# Patient Record
Sex: FEMALE | Race: WHITE | NOT HISPANIC OR LATINO | URBAN - METROPOLITAN AREA
[De-identification: names, ages, dates, MRNs, and addresses within clinical notes are randomized per-mention and may not be internally consistent; named-entity substitution may affect disease eponyms.]

---

## 2018-09-07 ENCOUNTER — NEW PATIENT (OUTPATIENT)
Dept: URBAN - METROPOLITAN AREA CLINIC 80 | Facility: CLINIC | Age: 63
End: 2018-09-07
Payer: MEDICAID

## 2018-09-07 DIAGNOSIS — H25.811 COMBINED FORMS OF AGE-RELATED CATARACT, RIGHT EYE: Primary | ICD-10-CM

## 2018-09-07 DIAGNOSIS — H52.223 REGULAR ASTIGMATISM, BILATERAL: ICD-10-CM

## 2018-09-07 PROCEDURE — 92004 COMPRE OPH EXAM NEW PT 1/>: CPT | Performed by: OPHTHALMOLOGY

## 2018-09-07 ASSESSMENT — INTRAOCULAR PRESSURE
OS: 21
OD: 19

## 2018-09-07 ASSESSMENT — KERATOMETRY
OD: 46.40
OS: 46.14

## 2018-09-07 ASSESSMENT — VISUAL ACUITY
OD: 20/30
OS: 20/40

## 2018-10-05 ENCOUNTER — Encounter (OUTPATIENT)
Dept: URBAN - METROPOLITAN AREA CLINIC 80 | Facility: CLINIC | Age: 63
End: 2018-10-05
Payer: COMMERCIAL

## 2018-10-05 DIAGNOSIS — H25.812 COMBINED FORMS OF AGE-RELATED CATARACT, LEFT EYE: ICD-10-CM

## 2018-10-05 PROCEDURE — 99213 OFFICE O/P EST LOW 20 MIN: CPT | Performed by: NURSE PRACTITIONER

## 2018-10-11 ENCOUNTER — SURGERY (OUTPATIENT)
Dept: URBAN - METROPOLITAN AREA SURGERY 50 | Facility: SURGERY | Age: 63
End: 2018-10-11
Payer: COMMERCIAL

## 2018-10-25 ENCOUNTER — SURGERY (OUTPATIENT)
Dept: URBAN - METROPOLITAN AREA SURGERY 50 | Facility: SURGERY | Age: 63
End: 2018-10-25
Payer: MEDICAID

## 2019-02-11 ENCOUNTER — Encounter (OUTPATIENT)
Dept: URBAN - METROPOLITAN AREA CLINIC 80 | Facility: CLINIC | Age: 64
End: 2019-02-11
Payer: COMMERCIAL

## 2019-02-11 DIAGNOSIS — H26.493 OTHER SECONDARY CATARACT, BILATERAL: ICD-10-CM

## 2019-02-11 DIAGNOSIS — Z01.818 ENCOUNTER FOR OTHER PREPROCEDURAL EXAMINATION: Primary | ICD-10-CM

## 2019-02-11 PROCEDURE — 99213 OFFICE O/P EST LOW 20 MIN: CPT | Performed by: NURSE PRACTITIONER

## 2019-02-14 ENCOUNTER — SURGERY (OUTPATIENT)
Dept: URBAN - METROPOLITAN AREA SURGERY 50 | Facility: SURGERY | Age: 64
End: 2019-02-14
Payer: COMMERCIAL

## 2019-02-28 ENCOUNTER — SURGERY (OUTPATIENT)
Dept: URBAN - METROPOLITAN AREA SURGERY 50 | Facility: SURGERY | Age: 64
End: 2019-02-28
Payer: COMMERCIAL

## 2021-03-12 ENCOUNTER — OFFICE VISIT (OUTPATIENT)
Dept: URBAN - METROPOLITAN AREA CLINIC 76 | Facility: CLINIC | Age: 66
End: 2021-03-12
Payer: COMMERCIAL

## 2021-03-12 DIAGNOSIS — H04.123 DRY EYE SYNDROME OF BILATERAL LACRIMAL GLANDS: ICD-10-CM

## 2021-03-12 PROCEDURE — 92002 INTRM OPH EXAM NEW PATIENT: CPT | Performed by: OPTOMETRIST

## 2021-03-12 RX ORDER — LOTEPREDNOL ETABONATE 5 MG/ML
0.5 % SUSPENSION/ DROPS OPHTHALMIC
Qty: 15 | Refills: 1 | Status: ACTIVE
Start: 2021-03-12

## 2021-03-12 ASSESSMENT — INTRAOCULAR PRESSURE
OS: 23
OD: 20

## 2021-03-12 ASSESSMENT — KERATOMETRY
OS: 46.25
OD: 45.63

## 2021-03-12 NOTE — IMPRESSION/PLAN
Impression: Dry eye syndrome of bilateral lacrimal glands: H04.123. Cause of fluctuating vision. Plan: Discussed diagnosis in detail with patient. Warm compresses with lid massage once daily from top / down, bottom / up, and sweep from inside / out x 2. Patient instructed to use PF emulsive base lubricant 3-4 x a day. Increase omega foods/nuts and/or Borage oil supplement 1500-2500mg capsule orally per day. Lotemax suspension TID OU.

## 2021-03-12 NOTE — IMPRESSION/PLAN
Impression: Presbyopia: H52.4. Bilateral. Diplopia. Hx of spinal surgeries. Negative for thyroid issues. Blood work normal. No horizontal prism measured. Plan: Discussed condition. Measured new mrx given today w/ prism, not dispensed yet due to dry eye. Recommended dry eye therapy first then remeasure glasses for stability.

## 2021-04-02 ENCOUNTER — OFFICE VISIT (OUTPATIENT)
Dept: URBAN - METROPOLITAN AREA CLINIC 76 | Facility: CLINIC | Age: 66
End: 2021-04-02
Payer: COMMERCIAL

## 2021-04-02 DIAGNOSIS — Z96.1 PRESENCE OF INTRAOCULAR LENS: ICD-10-CM

## 2021-04-02 DIAGNOSIS — H52.4 PRESBYOPIA: ICD-10-CM

## 2021-04-02 PROCEDURE — 92015 DETERMINE REFRACTIVE STATE: CPT | Performed by: OPTOMETRIST

## 2021-04-02 PROCEDURE — 99212 OFFICE O/P EST SF 10 MIN: CPT | Performed by: OPTOMETRIST

## 2021-04-02 ASSESSMENT — VISUAL ACUITY
OD: 20/30
OS: 20/30

## 2021-04-02 ASSESSMENT — KERATOMETRY
OS: 46.50
OD: 45.88

## 2021-04-02 ASSESSMENT — INTRAOCULAR PRESSURE
OS: 17
OD: 14

## 2021-04-02 NOTE — IMPRESSION/PLAN
Impression: Presbyopia: H52.4. Bilateral. Diplopia. Hx of spinal surgeries. Negative for thyroid issues. Blood work normal. No horizontal prism measured. Double w/ RT gaze & resolves w/ one eye covered. Plan: Discussed condition.   Measured new mrx given today w/ prism,

## 2021-04-02 NOTE — IMPRESSION/PLAN
Impression: Dry eye syndrome of bilateral lacrimal glands: H04.123. Cause of fluctuating vision. Plan: Discussed diagnosis in detail with patient. Warm compresses with lid massage once daily from top / down, bottom / up, and sweep from inside / out x 2. Patient instructed to use PF emulsive base lubricant 3-4 x a day. Increase omega foods/nuts and/or Borage oil supplement 1500-2500mg capsule orally per day. Continue Lotemax suspension BID OS, QD OD, until eyes start to feel improved. Pt will then d/c OD and use QD OS only  for a week and then d/c Lotemax.

## 2021-05-20 ENCOUNTER — TESTING ONLY (OUTPATIENT)
Dept: URBAN - METROPOLITAN AREA CLINIC 76 | Facility: CLINIC | Age: 66
End: 2021-05-20

## 2021-05-20 PROCEDURE — V2799 MISC VISION ITEM OR SERVICE: HCPCS | Performed by: OPTOMETRIST

## 2021-05-20 ASSESSMENT — VISUAL ACUITY
OS: 20/25
OD: 20/25

## 2021-05-20 NOTE — IMPRESSION/PLAN
Impression: Presbyopia: H52.4. Bilateral. Diplopia. Hx of spinal surgeries. Negative for thyroid issues. Blood work normal. No horizontal prism measured. Double w/ RT gaze & resolves w/ one eye covered. Recommend consult w/neurology for possible botox inj for occipital neuralgia  Plan: Rediscussed condition.   Measured new mrx given today w/ prism (rx redo)

## 2021-06-22 ENCOUNTER — TESTING ONLY (OUTPATIENT)
Dept: URBAN - METROPOLITAN AREA CLINIC 76 | Facility: CLINIC | Age: 66
End: 2021-06-22
Payer: COMMERCIAL

## 2021-06-22 DIAGNOSIS — H53.2 DIPLOPIA: ICD-10-CM

## 2021-06-22 PROCEDURE — 92015 DETERMINE REFRACTIVE STATE: CPT | Performed by: OPTOMETRIST

## 2021-06-22 ASSESSMENT — KERATOMETRY: OS: 46.50

## 2021-06-22 NOTE — IMPRESSION/PLAN
Impression: Diplopia: H53.2. Bilateral. Hx of spinal surgeries. Negative for thyroid issues. Blood work normal. No horizontal prism measured. Double w/ RT gaze & resolves w/ one eye covered. Plan: See plan #1. Recommend consult w/neurology for possible botox inj for occipital neuralgia.

## 2021-06-22 NOTE — IMPRESSION/PLAN
Impression: Presbyopia: H52.4. Bilateral. Plan: Rediscussed condition in great detail. Measured new mrx given today, minimal change, pt defers prism in glasses (rx redo). Pt to call with concerns.

## 2021-07-27 ENCOUNTER — TESTING ONLY (OUTPATIENT)
Dept: URBAN - METROPOLITAN AREA CLINIC 76 | Facility: CLINIC | Age: 66
End: 2021-07-27

## 2021-07-27 DIAGNOSIS — H55.00 NYSTAGMUS: ICD-10-CM

## 2021-07-27 PROCEDURE — V2799 MISC VISION ITEM OR SERVICE: HCPCS | Performed by: OPTOMETRIST

## 2021-07-27 ASSESSMENT — KERATOMETRY
OS: 46.25
OD: 45.63

## 2021-07-27 NOTE — IMPRESSION/PLAN
Impression: Presbyopia: H52.4 Bilateral. Pt has double vision only under extreme gaze right>left. Plan: Explained in detail how history of neurological symptoms and issues patient has can cause intermittent double vision and glasses are not going to be able correct. Pt has tried 2 different pairs of glasses with and without prism and symptoms are not improving or going away. Pt had a consult with a strabismus doctor who told her there was nothing they could do for her. Pt has consult with neurologist in September.

## 2021-07-27 NOTE — IMPRESSION/PLAN
Impression: Nystagmus: H55.00. Vertical Plan: Primary gaze slow. In extreme right or left gaze the amplitude is intensified.

## 2023-10-13 ENCOUNTER — OFFICE VISIT (OUTPATIENT)
Dept: URBAN - METROPOLITAN AREA CLINIC 76 | Facility: CLINIC | Age: 68
End: 2023-10-13
Payer: COMMERCIAL

## 2023-10-13 DIAGNOSIS — H52.4 PRESBYOPIA: ICD-10-CM

## 2023-10-13 DIAGNOSIS — H53.2 DIPLOPIA: Primary | ICD-10-CM

## 2023-10-13 DIAGNOSIS — H40.013 GLAUCOMA SUSPECT - LOW RISK BILATERAL: ICD-10-CM

## 2023-10-13 DIAGNOSIS — H55.00 NYSTAGMUS: ICD-10-CM

## 2023-10-13 PROCEDURE — 99214 OFFICE O/P EST MOD 30 MIN: CPT | Performed by: OPTOMETRIST

## 2023-10-13 RX ORDER — IBUPROFEN 800 MG/1
800 MG TABLET, FILM COATED ORAL
Qty: 0 | Refills: 0 | Status: ACTIVE
Start: 2023-10-13

## 2023-10-13 ASSESSMENT — INTRAOCULAR PRESSURE
OS: 16
OD: 16

## 2023-10-13 ASSESSMENT — KERATOMETRY
OS: 46.38
OD: 46.13

## 2023-11-14 PROCEDURE — 92083 EXTENDED VISUAL FIELD XM: CPT | Performed by: OPTOMETRIST

## 2023-11-14 PROCEDURE — 92133 CPTRZD OPH DX IMG PST SGM ON: CPT | Performed by: OPTOMETRIST

## 2023-11-14 PROCEDURE — 76514 ECHO EXAM OF EYE THICKNESS: CPT | Performed by: OPTOMETRIST

## 2023-11-14 PROCEDURE — 99214 OFFICE O/P EST MOD 30 MIN: CPT | Performed by: OPTOMETRIST
